# Patient Record
Sex: FEMALE | Race: WHITE
[De-identification: names, ages, dates, MRNs, and addresses within clinical notes are randomized per-mention and may not be internally consistent; named-entity substitution may affect disease eponyms.]

---

## 2021-10-15 ENCOUNTER — HOSPITAL ENCOUNTER (OUTPATIENT)
Dept: HOSPITAL 95 - LAB | Age: 81
Discharge: HOME | End: 2021-10-15
Attending: PHYSICIAN ASSISTANT
Payer: MEDICARE

## 2021-10-15 DIAGNOSIS — E11.9: Primary | ICD-10-CM

## 2021-10-15 LAB
CREAT UR-MCNC: 221 MG/DL (ref 27–270)
MICROALBUMIN UR-MCNC: 32.7 MG/L (ref 0–20)
MICROALBUMIN/CREAT UR: 14.8 MG/G (ref 0–30)

## 2021-12-15 ENCOUNTER — HOSPITAL ENCOUNTER (OUTPATIENT)
Dept: HOSPITAL 95 - ER | Age: 81
Setting detail: OBSERVATION
LOS: 2 days | Discharge: HOME | End: 2021-12-17
Attending: STUDENT IN AN ORGANIZED HEALTH CARE EDUCATION/TRAINING PROGRAM | Admitting: HOSPITALIST
Payer: MEDICARE

## 2021-12-15 VITALS — BODY MASS INDEX: 27 KG/M2 | WEIGHT: 167.99 LBS | HEIGHT: 66 IN

## 2021-12-15 DIAGNOSIS — I11.9: ICD-10-CM

## 2021-12-15 DIAGNOSIS — E11.9: ICD-10-CM

## 2021-12-15 DIAGNOSIS — E03.9: ICD-10-CM

## 2021-12-15 DIAGNOSIS — E78.5: ICD-10-CM

## 2021-12-15 DIAGNOSIS — I70.0: ICD-10-CM

## 2021-12-15 DIAGNOSIS — I65.23: ICD-10-CM

## 2021-12-15 DIAGNOSIS — I16.1: Primary | ICD-10-CM

## 2021-12-15 DIAGNOSIS — I08.1: ICD-10-CM

## 2021-12-15 DIAGNOSIS — Z20.822: ICD-10-CM

## 2021-12-15 DIAGNOSIS — I63.9: ICD-10-CM

## 2021-12-15 DIAGNOSIS — I27.20: ICD-10-CM

## 2021-12-15 DIAGNOSIS — I95.9: ICD-10-CM

## 2021-12-15 LAB
ALBUMIN SERPL BCP-MCNC: 3.4 G/DL (ref 3.4–5)
ALBUMIN/GLOB SERPL: 1 {RATIO} (ref 0.8–1.8)
ALT SERPL W P-5'-P-CCNC: 11 U/L (ref 12–78)
ANION GAP SERPL CALCULATED.4IONS-SCNC: 7 MMOL/L (ref 6–16)
AST SERPL W P-5'-P-CCNC: 13 U/L (ref 12–37)
BASOPHILS # BLD AUTO: 0.13 K/MM3 (ref 0–0.23)
BASOPHILS NFR BLD AUTO: 1 % (ref 0–2)
BILIRUB SERPL-MCNC: 0.4 MG/DL (ref 0.1–1)
BUN SERPL-MCNC: 15 MG/DL (ref 8–24)
CALCIUM SERPL-MCNC: 9 MG/DL (ref 8.5–10.1)
CHLORIDE SERPL-SCNC: 110 MMOL/L (ref 98–108)
CO2 SERPL-SCNC: 24 MMOL/L (ref 21–32)
CREAT SERPL-MCNC: 0.76 MG/DL (ref 0.4–1)
DEPRECATED RDW RBC AUTO: 43.3 FL (ref 35.1–46.3)
EOSINOPHIL # BLD AUTO: 0.4 K/MM3 (ref 0–0.68)
EOSINOPHIL NFR BLD AUTO: 4 % (ref 0–6)
ERYTHROCYTE [DISTWIDTH] IN BLOOD BY AUTOMATED COUNT: 13 % (ref 11.7–14.2)
GLOBULIN SER CALC-MCNC: 3.4 G/DL (ref 2.2–4)
GLUCOSE SERPL-MCNC: 134 MG/DL (ref 70–99)
HCT VFR BLD AUTO: 49.6 % (ref 33–51)
HGB BLD-MCNC: 16.4 G/DL (ref 11.5–16)
IMM GRANULOCYTES # BLD AUTO: 0.05 K/MM3 (ref 0–0.1)
IMM GRANULOCYTES NFR BLD AUTO: 0 % (ref 0–1)
LYMPHOCYTES # BLD AUTO: 3.09 K/MM3 (ref 0.84–5.2)
LYMPHOCYTES NFR BLD AUTO: 27 % (ref 21–46)
MCHC RBC AUTO-ENTMCNC: 33.1 G/DL (ref 31.5–36.5)
MCV RBC AUTO: 91 FL (ref 80–100)
MONOCYTES # BLD AUTO: 1.06 K/MM3 (ref 0.16–1.47)
MONOCYTES NFR BLD AUTO: 9 % (ref 4–13)
NEUTROPHILS # BLD AUTO: 6.64 K/MM3 (ref 1.96–9.15)
NEUTROPHILS NFR BLD AUTO: 59 % (ref 41–73)
NRBC # BLD AUTO: 0 K/MM3 (ref 0–0.02)
NRBC BLD AUTO-RTO: 0 /100 WBC (ref 0–0.2)
PLATELET # BLD AUTO: 256 K/MM3 (ref 150–400)
POTASSIUM SERPL-SCNC: 3.8 MMOL/L (ref 3.5–5.5)
PROT SERPL-MCNC: 6.8 G/DL (ref 6.4–8.2)
SODIUM SERPL-SCNC: 141 MMOL/L (ref 136–145)
TROPONIN I SERPL-MCNC: 0.02 NG/ML (ref 0–0.04)

## 2021-12-15 PROCEDURE — A9270 NON-COVERED ITEM OR SERVICE: HCPCS

## 2021-12-16 LAB
CHOLEST SERPL-MCNC: 191 MG/DL (ref 50–200)
CHOLEST/HDLC SERPL: 3.6 {RATIO}
FLUAV RNA SPEC QL NAA+PROBE: NEGATIVE
FLUBV RNA SPEC QL NAA+PROBE: NEGATIVE
HDLC SERPL-MCNC: 53 MG/DL (ref 39–?)
LDLC SERPL CALC-MCNC: 111 MG/DL (ref 0–110)
LDLC/HDLC SERPL: 2.1 {RATIO}
RSV RNA SPEC QL NAA+PROBE: NEGATIVE
SARS-COV-2 RNA RESP QL NAA+PROBE: NEGATIVE
TRIGL SERPL-MCNC: 135 MG/DL (ref 30–160)
VLDLC SERPL CALC-MCNC: 27 MG/DL (ref 6–32)

## 2021-12-16 NOTE — NUR
Initial Interview with [facility] Community Care Coordinator
1. Who did you speak with?  Spoke with patient
2.  What is the patient's prior level of functions? Independent lives with her
 Chinedu.  They are full time RVers.  Patient is ambulatory able to
perform housekeeping and cooking needs.  Patient ambulatory and states she is
pretty healthy and mobile and lives and active lifestyle.
3.  What is the patient's current living situation?  Lives with ; they
are full time RVers.
4.  Is the patient and/or family able to provide transportation to and from
doctor's appointments and  prescriptions? Yes, patient able to drive
and has transportation.
5.  Does patient still drive?  Yes.
6. POA/PCP/NOK:  PCP-SHARON Vincent/NOK:  Chinedu/two daughters in
[place]
7.  Discharge goals: Home
 -Home: patient more than likely will discharge home-no barriers; patient has
running water/utilities/safe home environment/strong support network of family
and friends.
 -DME: TBD
 -Medication Management: self-management
 -Preferred Pharmacy: [facility]
 -Housekeeping need: patient and  perform as needed
 -Cooking:  and patient cook as needed
8.  List barriers to discharge: None known at this time
9.  Discharge Plan: TBD
10. PCP Follow up appointment: Will be scheduled within seven calendar days of
discharge.  Transition of care will contact patient.

## 2021-12-16 NOTE — NUR
SHIFT SUMMARY:
 
ASSUMED CARE OF PATIENT UPON HER ARRIVAL FROM ED AT 1530. A&O X 4, PLEASANT
AND COOPERATIVE. HYPERTENSIVE ON ARRIVAL TO UNIT; HYDRALAZINE GIVEN, RESULT
161/74. REPORTED THIS TO DR. PERAZA, NO FURTHER PRN'S ORDERED. ON TELEMETRY,
SR @ 82. , NO COVERAGE NEEDED. INDEPENDENT IN ROOM.

## 2021-12-17 NOTE — NUR
SHIFT SUMMARY
 
ASSUMED CARE AT 1900. PT RESTED WELL OVERNIGHT. NO ACUTE EVENTS OVERNIGHT.
VITAL SIGNS WERE STABLE, WNL DURING SHIFT. CARDIAC TELEMETRY MONITORING
CONTINUES, BOX# [id number], SINUS RHYTHM, HR 70, VERIFIED WITH PRAVEENA TELEMETRY
TECH. CBG LAST NIGHT WAS 107MG/DL. SCHEDULED MEDICATIONS WERE ADMINISTERED.
NEW ORDER FOR NIFEDIPINE XL 30MG DAILY TO START 12/17/21 AT 0900 NOTED. PT
PLEASANT. 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 PT IS INDEPENDENT
WITH ADLs AND ABLE TO VOICE HER NEEDS. NO COMPLAINTS VOICED. BED REMAINS IN
LOW POSITION WITH THE CALL LIGHT WITHIN EASY REACH. WILL CONTINUE TO MONITOR.

## 2022-01-18 ENCOUNTER — HOSPITAL ENCOUNTER (OUTPATIENT)
Dept: HOSPITAL 95 - LAB SHORT | Age: 82
End: 2022-01-18
Attending: NURSE PRACTITIONER
Payer: MEDICARE

## 2022-01-18 DIAGNOSIS — N89.8: Primary | ICD-10-CM

## 2022-01-24 ENCOUNTER — HOSPITAL ENCOUNTER (OUTPATIENT)
Dept: HOSPITAL 95 - LAB SHORT | Age: 82
Discharge: HOME | End: 2022-01-24
Attending: OBSTETRICS & GYNECOLOGY
Payer: MEDICARE

## 2022-01-24 DIAGNOSIS — C54.1: Primary | ICD-10-CM

## 2022-01-24 DIAGNOSIS — R19.00: ICD-10-CM

## 2022-01-24 DIAGNOSIS — Z01.419: Primary | ICD-10-CM

## 2022-01-24 PROCEDURE — G0123 SCREEN CERV/VAG THIN LAYER: HCPCS

## 2022-01-26 LAB — OTHER STN SPEC: (no result)

## 2022-05-04 ENCOUNTER — HOSPITAL ENCOUNTER (OUTPATIENT)
Dept: HOSPITAL 95 - ORSCMMR | Age: 82
Discharge: HOME | End: 2022-05-04
Attending: SURGERY
Payer: MEDICARE

## 2022-05-04 VITALS — BODY MASS INDEX: 26.4 KG/M2 | HEIGHT: 66 IN | WEIGHT: 164.24 LBS

## 2022-05-04 DIAGNOSIS — E11.9: ICD-10-CM

## 2022-05-04 DIAGNOSIS — Z79.82: ICD-10-CM

## 2022-05-04 DIAGNOSIS — Z86.73: ICD-10-CM

## 2022-05-04 DIAGNOSIS — Z79.899: ICD-10-CM

## 2022-05-04 DIAGNOSIS — I10: ICD-10-CM

## 2022-05-04 DIAGNOSIS — Z79.84: ICD-10-CM

## 2022-05-04 DIAGNOSIS — I27.20: ICD-10-CM

## 2022-05-04 DIAGNOSIS — C54.8: Primary | ICD-10-CM

## 2022-05-04 LAB
ANION GAP SERPL CALCULATED.4IONS-SCNC: 6 MMOL/L (ref 6–16)
BUN SERPL-MCNC: 15 MG/DL (ref 8–24)
CALCIUM SERPL-MCNC: 9.1 MG/DL (ref 8.5–10.1)
CHLORIDE SERPL-SCNC: 110 MMOL/L (ref 98–108)
CO2 SERPL-SCNC: 27 MMOL/L (ref 21–32)
CREAT SERPL-MCNC: 0.69 MG/DL (ref 0.4–1)
GLUCOSE SERPL-MCNC: 116 MG/DL (ref 70–99)
POTASSIUM SERPL-SCNC: 4.1 MMOL/L (ref 3.5–5.5)
SODIUM SERPL-SCNC: 143 MMOL/L (ref 136–145)

## 2022-05-04 PROCEDURE — A9270 NON-COVERED ITEM OR SERVICE: HCPCS

## 2022-05-04 PROCEDURE — C1788 PORT, INDWELLING, IMP: HCPCS

## 2022-05-04 PROCEDURE — B543ZZA ULTRASONOGRAPHY OF RIGHT JUGULAR VEINS, GUIDANCE: ICD-10-PCS | Performed by: SURGERY

## 2022-05-04 PROCEDURE — 05HM33Z INSERTION OF INFUSION DEVICE INTO RIGHT INTERNAL JUGULAR VEIN, PERCUTANEOUS APPROACH: ICD-10-PCS | Performed by: SURGERY

## 2022-05-04 NOTE — NUR
PT AXOX4, ABLE TO REPOSITION SELF IN BED. REPORTS NO HEADACHE OR CHESTPAIN,
REQUESTING PO FLUIDS. PT HAS TWO INCISON SITES. ONE INCISION SITE ON RIGHT
CHEST AND ONE ON RIGHT NECK. RIGHT CHEST IS COVERED WITH WINDOW TAPE AND GAUZE
AND IS CLEAN, DRY AND INTACT. RIGHT NECK INCISION SITE IS COVERED WITH
STERISTRIP WITH SCAN AMOUNT OF SEROSANGUINEOUS FLUID ON THE DRESSING.

## 2022-09-17 ENCOUNTER — HOSPITAL ENCOUNTER (OUTPATIENT)
Dept: HOSPITAL 95 - ATC | Age: 82
Discharge: HOME | End: 2022-09-17
Attending: OBSTETRICS & GYNECOLOGY
Payer: MEDICARE

## 2022-09-17 DIAGNOSIS — Z79.84: ICD-10-CM

## 2022-09-17 DIAGNOSIS — I10: ICD-10-CM

## 2022-09-17 DIAGNOSIS — E11.9: ICD-10-CM

## 2022-09-17 DIAGNOSIS — Z86.73: ICD-10-CM

## 2022-09-17 DIAGNOSIS — Z79.899: ICD-10-CM

## 2022-09-17 DIAGNOSIS — C54.8: Primary | ICD-10-CM

## 2022-09-17 PROCEDURE — P9016 RBC LEUKOCYTES REDUCED: HCPCS

## 2022-10-18 ENCOUNTER — HOSPITAL ENCOUNTER (OUTPATIENT)
Dept: HOSPITAL 95 - LAB SHORT | Age: 82
End: 2022-10-18
Attending: SURGERY
Payer: MEDICARE

## 2022-10-18 DIAGNOSIS — C54.1: ICD-10-CM

## 2022-10-18 DIAGNOSIS — Z51.0: Primary | ICD-10-CM

## 2022-10-18 LAB
LEUKOCYTE ESTERASE UR QL STRIP: (no result)
PROT UR STRIP-MCNC: (no result) MG/DL
RBC #/AREA URNS HPF: (no result) /HPF (ref 0–2)
SP GR SPEC: 1 (ref 1–1.02)
UROBILINOGEN UR STRIP-MCNC: (no result) MG/DL
WBC #/AREA URNS HPF: (no result) /HPF (ref 0–5)

## 2022-10-20 ENCOUNTER — HOSPITAL ENCOUNTER (INPATIENT)
Dept: HOSPITAL 95 - ER | Age: 82
LOS: 4 days | Discharge: HOME | DRG: 247 | End: 2022-10-24
Attending: INTERNAL MEDICINE | Admitting: INTERNAL MEDICINE
Payer: MEDICARE

## 2022-10-20 VITALS — BODY MASS INDEX: 25.69 KG/M2 | HEIGHT: 66 IN | WEIGHT: 159.84 LBS

## 2022-10-20 DIAGNOSIS — Z92.3: ICD-10-CM

## 2022-10-20 DIAGNOSIS — Z90.49: ICD-10-CM

## 2022-10-20 DIAGNOSIS — I21.09: Primary | ICD-10-CM

## 2022-10-20 DIAGNOSIS — Z79.899: ICD-10-CM

## 2022-10-20 DIAGNOSIS — I48.91: ICD-10-CM

## 2022-10-20 DIAGNOSIS — E03.9: ICD-10-CM

## 2022-10-20 DIAGNOSIS — N39.0: ICD-10-CM

## 2022-10-20 DIAGNOSIS — I27.20: ICD-10-CM

## 2022-10-20 DIAGNOSIS — I95.9: ICD-10-CM

## 2022-10-20 DIAGNOSIS — I50.22: ICD-10-CM

## 2022-10-20 DIAGNOSIS — Z85.42: ICD-10-CM

## 2022-10-20 DIAGNOSIS — Z79.82: ICD-10-CM

## 2022-10-20 DIAGNOSIS — Z79.84: ICD-10-CM

## 2022-10-20 DIAGNOSIS — I10: ICD-10-CM

## 2022-10-20 DIAGNOSIS — Z86.711: ICD-10-CM

## 2022-10-20 DIAGNOSIS — D63.1: ICD-10-CM

## 2022-10-20 DIAGNOSIS — Z79.01: ICD-10-CM

## 2022-10-20 DIAGNOSIS — I21.3: ICD-10-CM

## 2022-10-20 DIAGNOSIS — I11.0: ICD-10-CM

## 2022-10-20 DIAGNOSIS — N18.30: ICD-10-CM

## 2022-10-20 DIAGNOSIS — Z90.710: ICD-10-CM

## 2022-10-20 LAB
ALBUMIN SERPL BCP-MCNC: 3.5 G/DL (ref 3.4–5)
ALBUMIN/GLOB SERPL: 1 {RATIO} (ref 0.8–1.8)
ALT SERPL W P-5'-P-CCNC: 9 U/L (ref 12–78)
ANION GAP SERPL CALCULATED.4IONS-SCNC: 12 MMOL/L (ref 6–16)
AST SERPL W P-5'-P-CCNC: 15 U/L (ref 12–37)
BASOPHILS # BLD AUTO: 0.06 K/MM3 (ref 0–0.23)
BASOPHILS NFR BLD AUTO: 1 % (ref 0–2)
BILIRUB SERPL-MCNC: 0.4 MG/DL (ref 0.1–1)
BUN SERPL-MCNC: 32 MG/DL (ref 8–24)
CALCIUM SERPL-MCNC: 9.5 MG/DL (ref 8.5–10.1)
CHLORIDE SERPL-SCNC: 105 MMOL/L (ref 98–108)
CO2 SERPL-SCNC: 22 MMOL/L (ref 21–32)
CREAT SERPL-MCNC: 1.38 MG/DL (ref 0.4–1)
DEPRECATED RDW RBC AUTO: 71.8 FL (ref 35.1–46.3)
EOSINOPHIL # BLD AUTO: 0.02 K/MM3 (ref 0–0.68)
EOSINOPHIL NFR BLD AUTO: 0 % (ref 0–6)
ERYTHROCYTE [DISTWIDTH] IN BLOOD BY AUTOMATED COUNT: 18 % (ref 11.7–14.2)
GLOBULIN SER CALC-MCNC: 3.5 G/DL (ref 2.2–4)
GLUCOSE SERPL-MCNC: 172 MG/DL (ref 70–99)
HCT VFR BLD AUTO: 28.9 % (ref 33–51)
HGB BLD-MCNC: 9.5 G/DL (ref 11.5–16)
IMM GRANULOCYTES # BLD AUTO: 0.05 K/MM3 (ref 0–0.1)
IMM GRANULOCYTES NFR BLD AUTO: 0 % (ref 0–1)
LYMPHOCYTES # BLD AUTO: 1.64 K/MM3 (ref 0.84–5.2)
LYMPHOCYTES NFR BLD AUTO: 15 % (ref 21–46)
MCHC RBC AUTO-ENTMCNC: 32.9 G/DL (ref 31.5–36.5)
MCV RBC AUTO: 108 FL (ref 80–100)
MONOCYTES # BLD AUTO: 0.85 K/MM3 (ref 0.16–1.47)
MONOCYTES NFR BLD AUTO: 8 % (ref 4–13)
NEUTROPHILS # BLD AUTO: 8.64 K/MM3 (ref 1.96–9.15)
NEUTROPHILS NFR BLD AUTO: 77 % (ref 41–73)
NRBC # BLD AUTO: 0 K/MM3 (ref 0–0.02)
NRBC BLD AUTO-RTO: 0 /100 WBC (ref 0–0.2)
PLATELET # BLD AUTO: 303 K/MM3 (ref 150–400)
POTASSIUM SERPL-SCNC: 4.3 MMOL/L (ref 3.5–5.5)
PROT SERPL-MCNC: 7 G/DL (ref 6.4–8.2)
PROTHROMBIN TIME: 11.3 SEC (ref 9.7–11.5)
SODIUM SERPL-SCNC: 139 MMOL/L (ref 136–145)

## 2022-10-20 PROCEDURE — C9606 PERC D-E COR REVASC W AMI S: HCPCS

## 2022-10-20 PROCEDURE — C1894 INTRO/SHEATH, NON-LASER: HCPCS

## 2022-10-20 PROCEDURE — 027034Z DILATION OF CORONARY ARTERY, ONE ARTERY WITH DRUG-ELUTING INTRALUMINAL DEVICE, PERCUTANEOUS APPROACH: ICD-10-PCS | Performed by: INTERNAL MEDICINE

## 2022-10-20 PROCEDURE — C1725 CATH, TRANSLUMIN NON-LASER: HCPCS

## 2022-10-20 PROCEDURE — C8929 TTE W OR WO FOL WCON,DOPPLER: HCPCS

## 2022-10-20 PROCEDURE — C1757 CATH, THROMBECTOMY/EMBOLECT: HCPCS

## 2022-10-20 PROCEDURE — C1769 GUIDE WIRE: HCPCS

## 2022-10-20 PROCEDURE — C1887 CATHETER, GUIDING: HCPCS

## 2022-10-20 PROCEDURE — B2111ZZ FLUOROSCOPY OF MULTIPLE CORONARY ARTERIES USING LOW OSMOLAR CONTRAST: ICD-10-PCS | Performed by: INTERNAL MEDICINE

## 2022-10-20 PROCEDURE — A9270 NON-COVERED ITEM OR SERVICE: HCPCS

## 2022-10-20 PROCEDURE — C1874 STENT, COATED/COV W/DEL SYS: HCPCS

## 2022-10-20 NOTE — NUR
ARRIVAL TO ICU/SHIFT SUMMARY
PT ARRIVES POST PCI AT 1455. ONE STENT PLACED TO MID LAD. TR BAND TO RIGHT
RADIAL. ECCHYMOSIS ABOVE BAND, SOFT, NON TENDER. DENIES NUMBNESS TO TINGLING
TO HAND. SR, RATE 70'S. BP STABLE. REPEAT EKG DONE. PT C/O MID BACK PAIN,
2/10. LUNGS CLEAR, 98% ON RA. A&OX4. MEDIPORT TO RIGHT CHEST WALL, ACCESSED
PER PT REQUEST. PT COMPLETED CHEMO FOR UTERINE CA, CURRENTLY RECEIVING
RADIATION TX, LAST TREATMENT YESTERDAY. STANDBY ASSIST TO BSC. ABLE TO MAKE
NEEDS KNOWN. WILL CONTINUE TO MONITOR.

## 2022-10-20 NOTE — NUR
This author assumed care of patient at 1900. Patient resting comfortably with
no current needs. TR band in place and not inflated.

## 2022-10-21 LAB
ANION GAP SERPL CALCULATED.4IONS-SCNC: 11 MMOL/L (ref 6–16)
BUN SERPL-MCNC: 34 MG/DL (ref 8–24)
CALCIUM SERPL-MCNC: 8.9 MG/DL (ref 8.5–10.1)
CHLORIDE SERPL-SCNC: 106 MMOL/L (ref 98–108)
CO2 SERPL-SCNC: 22 MMOL/L (ref 21–32)
CREAT SERPL-MCNC: 1.38 MG/DL (ref 0.4–1)
DEPRECATED RDW RBC AUTO: 70.4 FL (ref 35.1–46.3)
ERYTHROCYTE [DISTWIDTH] IN BLOOD BY AUTOMATED COUNT: 17.8 % (ref 11.7–14.2)
GLUCOSE SERPL-MCNC: 181 MG/DL (ref 70–99)
HCT VFR BLD AUTO: 26.6 % (ref 33–51)
HGB BLD-MCNC: 9 G/DL (ref 11.5–16)
MCHC RBC AUTO-ENTMCNC: 33.8 G/DL (ref 31.5–36.5)
MCV RBC AUTO: 107 FL (ref 80–100)
NRBC # BLD AUTO: 0 K/MM3 (ref 0–0.02)
NRBC BLD AUTO-RTO: 0 /100 WBC (ref 0–0.2)
PLATELET # BLD AUTO: 293 K/MM3 (ref 150–400)
POTASSIUM SERPL-SCNC: 4.6 MMOL/L (ref 3.5–5.5)
SODIUM SERPL-SCNC: 139 MMOL/L (ref 136–145)

## 2022-10-21 NOTE — NUR
SHIFT SUMMARY:
 
PATIENT TRANSFER TO PCU 05. ALERT AND ORIENTED X4. PERRLA. DENIES
NUMBNESS/TINGLING. SBA TO BATHROOM/CHAIR. ON ROOM AIR SATING ABOVE 95%. LUNGS
SOUNDING CLEAR AND DIM. TELE SHOWING SINUS RHYTHM WITH HR 80-90'S. DENIES
CHEST PAIN/PRESSURE. RIGHT RADIAL SITE WITH ARM BOARD IN PLACE. SITE
NONTENDER, BLUE/PURPLE BRUISING PROXIMAL TO SITE. NO SIGNS OF BLEEDING. POST
ANGIO EDUCATION AND RIGHT RADIAL SITE CARE REVIEWED. FAMILY AT BEDSIDE. DENIES
ABDOMINAL PAIN. INTERMIT NAUSEA. DENIES NAUSEA AT THIS TIME. EATING CHICKEN
NOODLE SOUP FOR DINNER. UP TO BSC TO URINATE. MEDIPORT ACCESSED AND TKO
INFUSING. CALL LIGHT IN REACH. ORIENTED TO PCU. WILL CONTINUE TO MONITOR AND
REPORT OFF TO ONCOMING RN.

## 2022-10-21 NOTE — NUR
Patient had a mostly uneventful shift overnight. She is alert and oriented x4.
Denies paresthesia. Pain minimal; 2/10 in shoulders, along with some mild
chest pressure. She has had persistent nausea and emesis, requiring call to MD
for one-time Phenergan dose, along with changing PRN Zofran to Q2hr. She has
been SR with occasional ectopy. Slightly hypertensive. No edema appreciated.
Pulses palpable; weaker in BLE. Patient on RA and satting in the mid- to
high-90s. Hemostasis achieved at R radial puncture site so TR band removed.
Patient demonstrates good strength with stand and pivot. SBA. Pt
did have a bowel movement overnight. Voiding spontaneously into commode.
Serial troponins were initially high overnight (>125k).
Per MD, this is expected. AM redraw shows levels declining to approx 70,000.

## 2022-10-21 NOTE — NUR
ASSUMED CARE
REPORT FROM BAM JACOME AT 0700. PT RESTING IN BED. WAKES c VERBAL STIMULI.
FOLLOWS DIRECTIONS. A&OX 4. PT C/O NAUSEA. DENIES CP, SOB. UNABLE TO TAKE PO
MEDS AT THIS TIME. MINIMAL RELIEF FROM ANTIEMETICS. SR, RATE 80-90'S. BP
STABLE. LUNGS CLEAR. PT P/W/D. RIGHT RADIAL SITE c UNCHANGED ECCHYMOSIS AND
SLIGHT SWELLING. OPSITE IN PLACE. PLACED ARMBOARD. EKG DONE FOR QT ANALYSIS.
DR LUGO ROUNDED. WILL CONTINUE TO MONITOR.

## 2022-10-21 NOTE — NUR
SHIFT SUMMARY/TRANSFER TO PCU
NAUSEA IMPROVED THIS SHIFT. MEDICATED c COMPAZINE. MONITORING QTC. ABLE
TO TOLERATE MEDS AND SOUP. SR ON MONITOR, NO ECTOPY NOTED. RATE 80'S. BP
STABLE. DENIES PAIN. STAND BY ASSIST TO BSC. MEDIPORT ACCESSED, TKO. ECHO
COMPLETED THIS SHIFT. WILL CONTINUE TO MONITOR UNTIL TRANSFER TO PCU.

## 2022-10-22 LAB
ANION GAP SERPL CALCULATED.4IONS-SCNC: 11 MMOL/L (ref 6–16)
ANION GAP SERPL CALCULATED.4IONS-SCNC: 7 MMOL/L (ref 6–16)
BASOPHILS # BLD AUTO: 0.03 K/MM3 (ref 0–0.23)
BASOPHILS NFR BLD AUTO: 0 % (ref 0–2)
BUN SERPL-MCNC: 36 MG/DL (ref 8–24)
BUN SERPL-MCNC: 37 MG/DL (ref 8–24)
CALCIUM SERPL-MCNC: 8.6 MG/DL (ref 8.5–10.1)
CALCIUM SERPL-MCNC: 9 MG/DL (ref 8.5–10.1)
CHLORIDE SERPL-SCNC: 100 MMOL/L (ref 98–108)
CHLORIDE SERPL-SCNC: 107 MMOL/L (ref 98–108)
CO2 SERPL-SCNC: 25 MMOL/L (ref 21–32)
CO2 SERPL-SCNC: 26 MMOL/L (ref 21–32)
CREAT SERPL-MCNC: 1.37 MG/DL (ref 0.4–1)
CREAT SERPL-MCNC: 1.47 MG/DL (ref 0.4–1)
DEPRECATED RDW RBC AUTO: 68.9 FL (ref 35.1–46.3)
DEPRECATED RDW RBC AUTO: 73 FL (ref 35.1–46.3)
EOSINOPHIL # BLD AUTO: 0.01 K/MM3 (ref 0–0.68)
EOSINOPHIL NFR BLD AUTO: 0 % (ref 0–6)
ERYTHROCYTE [DISTWIDTH] IN BLOOD BY AUTOMATED COUNT: 17.6 % (ref 11.7–14.2)
ERYTHROCYTE [DISTWIDTH] IN BLOOD BY AUTOMATED COUNT: 18.4 % (ref 11.7–14.2)
GLUCOSE SERPL-MCNC: 126 MG/DL (ref 70–99)
GLUCOSE SERPL-MCNC: 128 MG/DL (ref 70–99)
HCT VFR BLD AUTO: 25.9 % (ref 33–51)
HCT VFR BLD AUTO: 26.6 % (ref 33–51)
HGB BLD-MCNC: 8.5 G/DL (ref 11.5–16)
HGB BLD-MCNC: 8.8 G/DL (ref 11.5–16)
IMM GRANULOCYTES # BLD AUTO: 0.09 K/MM3 (ref 0–0.1)
IMM GRANULOCYTES NFR BLD AUTO: 1 % (ref 0–1)
KETONES UR STRIP-MCNC: (no result) MG/DL
LEUKOCYTE ESTERASE UR QL STRIP: (no result)
LYMPHOCYTES # BLD AUTO: 1.44 K/MM3 (ref 0.84–5.2)
LYMPHOCYTES NFR BLD AUTO: 10 % (ref 21–46)
MCHC RBC AUTO-ENTMCNC: 32.8 G/DL (ref 31.5–36.5)
MCHC RBC AUTO-ENTMCNC: 33.1 G/DL (ref 31.5–36.5)
MCV RBC AUTO: 106 FL (ref 80–100)
MCV RBC AUTO: 108 FL (ref 80–100)
MONOCYTES # BLD AUTO: 1.3 K/MM3 (ref 0.16–1.47)
MONOCYTES NFR BLD AUTO: 9 % (ref 4–13)
NEUTROPHILS # BLD AUTO: 11.72 K/MM3 (ref 1.96–9.15)
NEUTROPHILS NFR BLD AUTO: 80 % (ref 41–73)
NRBC # BLD AUTO: 0 K/MM3 (ref 0–0.02)
NRBC # BLD AUTO: 0 K/MM3 (ref 0–0.02)
NRBC BLD AUTO-RTO: 0 /100 WBC (ref 0–0.2)
NRBC BLD AUTO-RTO: 0 /100 WBC (ref 0–0.2)
PLATELET # BLD AUTO: 255 K/MM3 (ref 150–400)
PLATELET # BLD AUTO: 278 K/MM3 (ref 150–400)
POTASSIUM SERPL-SCNC: 3.7 MMOL/L (ref 3.5–5.5)
POTASSIUM SERPL-SCNC: 4.4 MMOL/L (ref 3.5–5.5)
PROT UR STRIP-MCNC: (no result) MG/DL
RBC #/AREA URNS HPF: (no result) /HPF (ref 0–2)
SODIUM SERPL-SCNC: 137 MMOL/L (ref 136–145)
SODIUM SERPL-SCNC: 139 MMOL/L (ref 136–145)
SP GR SPEC: 1.02 (ref 1–1.02)
TSH SERPL DL<=0.005 MIU/L-ACNC: 0.66 UIU/ML (ref 0.36–4.8)
UROBILINOGEN UR STRIP-MCNC: (no result) MG/DL
WBC CASTS #/AREA URNS LPF: (no result) /LPF

## 2022-10-22 NOTE — NUR
AM NOTE:
 
PATIENT ALERT AND ORIENTED X4. UP WITH SBA TO BSC. STATES SHE IS FEELING SOB
THIS AM LIKE SHE CANNOT TAKE A DEEP BREATH. LUNGS SOUNDING CLEAR AND DIM IN
BASES. NO COUGH. ON ROOM AIR SATING 94-95%. CHEST XRAY PENDING AS WELL AS BNP
LAB DRAW. TELE SHOWING SINUS RHYTHM WITH HR 80-90'S. DENIES CHEST PAIN. BP
STABLE. NO SIGNS OF EDEMA. RIGHT RADIAL SITE POST ANGIO WITH BRUISING
UNCHANGED FROM YESTERDAY DAY SHIFT. ARM BOARD IN PLACE. STATES SHE WAS HAVING
SOME NAUSEA THIS AM, MEDICATED BY NOC SHIFT NURSE. EATING SMALL AMOUNTS.
DENIES ABDOMINAL PAIN. ATTENDS IN PLACE. UP TO BSC TO URINATE. NEEDING URINE
SAMPLE THIS AM. MEDIPORT INFUSING WITH TKO NS. CALL LIGHT IN REACH. DENIES
NEEDS AT THIS TIME. CONTINUOUS PULSE OX IN PLACE TO MONITOR O2 STATUS.

## 2022-10-22 NOTE — NUR
CALL PLACED TO DR. FOWLER TO UPDATE ON BNP RESULTS. NEW ORDERS FOR THIS RN TO
PLACE. 40 MG LASIX IV NOW AS WELL AS 40 MG LASIX PO DAILY STARTING TOMORROW.
ORDERS IN PLACE. STILL WAITING FOR CHEST XRAY RESULTS.

## 2022-10-22 NOTE — NUR
SHIFT SUMMARY:
 
PATIENT REMAINS ALERT AND ORIENTED X4. SLEEPING MOST OF SHIFT. REMAINS ON ROOM
AIR SATING 95-97%. STATES BREATHING IS FEELING BETTER OVERAL COMPARED TO THIS
AM. REMAINS SINUS RHYTHM ON TELE WITH HR 70-80'S AT THIS TIME. BP STABLE.
DENIES CHEST PAIN/PRESSURE. RIGHT RADIAL SITE WNL AND UNCHANGED FROM THIS AM.
PO DIET POOR AND NOT INTERESTED IN EATING MUCH. STILL COMPLAINS OF UPSET
STOMACH BUT DENIES NEED FOR ANY NAUSEA MEDICATION THIS SHIFT. PO TUMS GIVEN
WITH AM MEDS. BOWEL TONES PRESENT. UP TO BSC TO VOID. FAMILY IN TO VISIT.
DENIES NEEDS AT THIS TIME. WILL CONTINUE TO MONITOR AND REPORT OFF TO ONCOMING
RN.

## 2022-10-22 NOTE — NUR
PT CONVERTED INTO AFIB FOR SHORT PERIOD OF TIME WITH RATE -150'S, SHE
WAS JUST WAKING UP ADJUSTING IN THE BED AT THE TIME. DENIED ANY CHEST PAIN,
FLUTTER, PALPITATIONS OR RACING. JUST HAD TO GO TO THE BATHROOM. SHE IS
CURRENTLY BACK OUT OF AFIB. WILL CONTINUE TO MONITOR.

## 2022-10-22 NOTE — NUR
SHIFT SUMMARY: AOX4, SBA TO COMMODE DUE TO IV. NO CHEST PAIN, SOB THIS SHIFT
EXCEPT WITH EXERTION. MILD NAUSEA THIS AM, NO EMESIS, COMPAZINE GIVEN.
REMAINED IN SINUS WITH FEW PVC'S. RIGHT RADIAL REMAINED UNCHANGED, BRUISING
AND IV BOARD IN PLACE. WBC INCREASED THIS AM TO 16.73, CREATINE 1.47 AND BUN
36. NO OTHER CHANGES ARE TO NOTE THIS SHIFT. WILL REPORT OFF.

## 2022-10-22 NOTE — NUR
HR JUMPED UP INTO 'S AND SUSTAINED AROUND 2215. HER RHYTHEM APPEARS TO
BE BETWEEN AFIB AND SVT, OCCATIONAL P WAVES ARE SEEN BUT THEN DISAPPEAR. SHE
WAS SLEEPING AT TIME, WHEN AWAKENED SHE AT FIRST DENIED ANY SYMPTOMS. DR. ECHEVERRIA CALLED AND EKG WAS DONE WHICH SHOWED UNDERDETERMINED RHYTHEM. HR AS HIGH
'S AT TIMES. 5MG OF METOPROLOL WAS GIVEN IV OVER 5 MINUTES. RATE IS NOW
120-140'S AVERAGE. SHE STATES SHE FEELS SHE HAS TO TAKE A DEEPER BREATH. DR. ECHEVERRIA STATES SHE FEELS IT IS REACTION TO HER STENT AND SOMETIMES WILL RESOLVE
ON ITS OWN. CURRENTLY SATS ARE GOOD. NO OTHER TREATMENTS AT THIS TIME, LABS
WERE DRAWN AS WELL.

## 2022-10-22 NOTE — NUR
ASSUMED CARE. AOX3, STATES FEELING A LITTLE BETTER BUT STILL NOT COMPLETLY.
SOB IMPROVED SOME BUT PRESENT WITH EXERTION. LS CLEAR AND DIM IN BASES, NO
COUGH OR CONGESTION. TRACE EDEMA TO BLE. SINUS ON MONITOR. DID HAVE SMALL RUN
OF SVT IN -150'S, LASTING SECONDS. PATIENT ASLEEP AT THAT TIME, SHE
STATES SHE DID NOT FEEL IT. DENIES ANY CHEST PAIN OR DISCOMFORT. WILL CONTINUE
TO MONITOR.

## 2022-10-22 NOTE — NUR
PATIENT STATES BREATHING HAS IMPROVED. SATING 97 ON ROOM AIR. LUNGS SOUNDS
REMAIN CLEAR AND DIM IN BASES. NO COUGH. STATES SHE IS STILL NOT FEELING
HERSELF AND HAS INTERMIT STOMACH UPSET. DENIES NAUSEA AT THIS TIME. DENIES
NEED FOR ANY MEDICATION. LAYING ON LEFT SIDE AND COMFORTABLE. AFTERNOON VITALS
STABLE.

## 2022-10-23 LAB
ANION GAP SERPL CALCULATED.4IONS-SCNC: 7 MMOL/L (ref 6–16)
BASOPHILS # BLD AUTO: 0.01 K/MM3 (ref 0–0.23)
BASOPHILS NFR BLD AUTO: 0 % (ref 0–2)
BUN SERPL-MCNC: 41 MG/DL (ref 8–24)
CALCIUM SERPL-MCNC: 8.4 MG/DL (ref 8.5–10.1)
CHLORIDE SERPL-SCNC: 102 MMOL/L (ref 98–108)
CO2 SERPL-SCNC: 28 MMOL/L (ref 21–32)
CREAT SERPL-MCNC: 1.4 MG/DL (ref 0.4–1)
DEPRECATED RDW RBC AUTO: 70.3 FL (ref 35.1–46.3)
EOSINOPHIL # BLD AUTO: 0.03 K/MM3 (ref 0–0.68)
EOSINOPHIL NFR BLD AUTO: 0 % (ref 0–6)
ERYTHROCYTE [DISTWIDTH] IN BLOOD BY AUTOMATED COUNT: 17.7 % (ref 11.7–14.2)
GLUCOSE SERPL-MCNC: 127 MG/DL (ref 70–99)
HCT VFR BLD AUTO: 25.8 % (ref 33–51)
HGB BLD-MCNC: 8.6 G/DL (ref 11.5–16)
IMM GRANULOCYTES # BLD AUTO: 0.09 K/MM3 (ref 0–0.1)
IMM GRANULOCYTES NFR BLD AUTO: 1 % (ref 0–1)
LYMPHOCYTES # BLD AUTO: 1.46 K/MM3 (ref 0.84–5.2)
LYMPHOCYTES NFR BLD AUTO: 13 % (ref 21–46)
MCHC RBC AUTO-ENTMCNC: 33.3 G/DL (ref 31.5–36.5)
MCV RBC AUTO: 108 FL (ref 80–100)
MONOCYTES # BLD AUTO: 1.15 K/MM3 (ref 0.16–1.47)
MONOCYTES NFR BLD AUTO: 10 % (ref 4–13)
NEUTROPHILS # BLD AUTO: 8.93 K/MM3 (ref 1.96–9.15)
NEUTROPHILS NFR BLD AUTO: 76 % (ref 41–73)
NRBC # BLD AUTO: 0.02 K/MM3 (ref 0–0.02)
NRBC BLD AUTO-RTO: 0.2 /100 WBC (ref 0–0.2)
PLATELET # BLD AUTO: 269 K/MM3 (ref 150–400)
POTASSIUM SERPL-SCNC: 3.6 MMOL/L (ref 3.5–5.5)
SODIUM SERPL-SCNC: 137 MMOL/L (ref 136–145)

## 2022-10-23 NOTE — NUR
SHIFT SUMMARY:
 
NO ACUTE CHANGES. BP REMAINS SOFT BUT STABLE. DENIES SOB. TELE CONTINUES TO
SHOW SINUS RHYTHM WITH HR 70-80'S. WEARING 2L FOR COMFORT SATING %.
DENIES ABDOMINAL PAIN. APPEATITE IMPROVING. FAMILY AT BEDSIDE AND UPDATED ON
PLAN OF CARE. UP TO BSC WITH ONE PERSON ASSISTANCE. BNP TRENDING DOWN.
MEDIPORT INFUSING NS AT TKO. VITALS REMAIN STABLE THROUGHOUT SHIFT. RIGHT
RADIAL SITE WNL. CONTINUES TO DENY CHEST PAIN/PRESSURE. CALL LIGHT IN REACH.
EATING DINNER AT THIS TIME. WILL CONTINUE TO MONITOR AND REPORT OFF TO
ONCOMING RN.

## 2022-10-23 NOTE — NUR
PT HR CONTINUES TO -160'S. BP DROPPING TO 84/49 MAP 60-66. C/O SOB.
CALLED DR. ALMENDAREZ TO INFORM OF CHANGES. NEW ORDERS OBTAINED.

## 2022-10-23 NOTE — NUR
DR. FOWLER AND DR. PALENCIA BY. NEW ORDERS FOR LASIX 20 MG PO DAILY AND
HOLDING PARAMETERS PLACED ON LISINOPRIL AND METOPROLOL. WILL RECHECK BNP THIS
AFTERNOON. BP REMAINS SOFT BUT STABLE AT THIS TIME. PATIENT EDUCATED AND
UPDATED ON CURRENT PLAN.

## 2022-10-23 NOTE — NUR
PT HAS CONVERTED BACK INTO SINUS WITH RATE IN THE 80'S. BP HAS HELD SOFT IN
THE 80-90'S WITH MAP >60. WILL CONTINUE TO MONITOR IF CONVERTION HOLD WILL
PLACE CARDIZEM ON SB.

## 2022-10-23 NOTE — NUR
O2 AT 2L PLACED FOR COMFORT. SATS 97% ON RA BUT SHE FEELS LIKE SHE NEEDS TO
WORK AT TAKING A DEEP BREATH. HR CONTINUES TO -140'S. NOTHING
OUTSTANDING NEW ON LABS. BP HOLDING.

## 2022-10-23 NOTE — NUR
AM NOTE:
 
PATIENT ALERT AND ORIENTED X4. NEURO WNL. OVERALL FEELS WEAK. SBA TO BSC.
DENIES NUMBNESS/TINGLING. WEARING HEARING AIDS AND GLASSESS. PERRLA. ON 2L
NASAL CANNULA FOR COMFORT. PATIENT SATING 100% ON THE 2L NASAL CANNULA. LUNGS
SOUNDING CLEAR AND DIM IN BASES. NO CRACKLES HEARD. DENIES COUGH. SLIGHTLY SOB
WHEN UP TO BSC. STATES BREATHING HAS IMPROVED SINCE YESTERDAY DAY SHIFT. TELE
SHOWING SINUS RHYTHM WITH HR 70-80'S. BP ON SOFTER SIDE. EPISODE OF CARDIZEM
LAST NIGHT SEE NOC SHIFT NOTES FOR DETAILS. CHARGE RN PLACED CALL TO DR. FOWLER
TO UPDATE. HOSP PROVIDOR CONSULT PLACED. THIS RN CALLED DR. PALENCIA TO UPDATE
ON PATIENT STATUS. PLAN TO COME SEE PATIENT SOON. DENIES ABDOMINAL
PAIN. INTERMIT STOMACH UPSET BUT IMPROVED FROM PRIOR DAY SHIFT. ABLE TO EAT
MORE THIS AM. NS RUNNING AT TKO INTO [facility]. CALL LIGHT IN REACH. PATIENT
DENIES NEEDS AT THIS TIME. WILL CONTINUE TO MONITOR.

## 2022-10-23 NOTE — NUR
CARDIZEM GTT STARTED, MAP 64, 'S. LR BOLUS STARTED AT 75ML/HR DUE TO
ELEVATED BNP YESTERDAY MORNING. WILL MONITOR CLOSE. LS CLEAR T/O. TRACE EDEMA
TO THE FEET.

## 2022-10-23 NOTE — NUR
SHIFT SUMMARY: PT CONVERTED INTO AFIB WITH RVR AROUND 2215 WITH HR AS HIGH AS
170'S BUT WAS SUSTAINING ON AVERAGE 150-160'S. SHE WAS GIVEN METOPROLOL IV 5MG
WHICH ONLY BROUGHT HR DOWN 'S FOR SHORT PERIOD OF TIME. EKG SHOWED
UNDERMINED RHYTHEM. ONLY SYMPTOM SHE DEVELOPED WAS FEELING LIKE SHE HAD TO
TAKE A DEEPER BREATH. PLACED ON 02 AT 2L WHICH HELPED TO RELEIVE THE FEELING.
HR CONTINUED TO STAY IN 'S DROPPING BP DOWN TO 80-90'S WITH MAP AVERAGE
60-65. LR BOLUS WAS STARTED AT LOW RATE OF 75 DUE TO HER FLUID OVERLOAD
PREVIOUS MORNING. CARDIZEM WAS STARTED AT 5MCQ AND WAS PLACED ON SB ALMOST 2
HOURS LATER AFTER SHE CONVERTED BACK TO SINUS. BLOOD PRESSURE CONTINUES TO BE
SOFT T/O THE NIGHT WITH MAP 60-65 AVERAGE. SHE HAS HAD FEW DROPS TO 58-59 BUT
COMES RIGHT BACK UP. PT IS FATIQUED FROM NIGHTS EVENTS. WILL REPORT TO
DAYSHIFT.

## 2022-10-24 LAB
ALBUMIN SERPL BCP-MCNC: 3 G/DL (ref 3.4–5)
ALBUMIN/GLOB SERPL: 1 {RATIO} (ref 0.8–1.8)
ALT SERPL W P-5'-P-CCNC: 15 U/L (ref 12–78)
ANION GAP SERPL CALCULATED.4IONS-SCNC: 10 MMOL/L (ref 6–16)
AST SERPL W P-5'-P-CCNC: 30 U/L (ref 12–37)
BASOPHILS # BLD AUTO: 0.02 K/MM3 (ref 0–0.23)
BASOPHILS NFR BLD AUTO: 0 % (ref 0–2)
BILIRUB SERPL-MCNC: 0.5 MG/DL (ref 0.1–1)
BUN SERPL-MCNC: 37 MG/DL (ref 8–24)
CALCIUM SERPL-MCNC: 8.8 MG/DL (ref 8.5–10.1)
CHLORIDE SERPL-SCNC: 101 MMOL/L (ref 98–108)
CO2 SERPL-SCNC: 25 MMOL/L (ref 21–32)
CREAT SERPL-MCNC: 1.06 MG/DL (ref 0.4–1)
DEPRECATED RDW RBC AUTO: 68.9 FL (ref 35.1–46.3)
EOSINOPHIL # BLD AUTO: 0 K/MM3 (ref 0–0.68)
EOSINOPHIL NFR BLD AUTO: 0 % (ref 0–6)
ERYTHROCYTE [DISTWIDTH] IN BLOOD BY AUTOMATED COUNT: 17.4 % (ref 11.7–14.2)
GLOBULIN SER CALC-MCNC: 3 G/DL (ref 2.2–4)
GLUCOSE SERPL-MCNC: 159 MG/DL (ref 70–99)
HCT VFR BLD AUTO: 26.2 % (ref 33–51)
HGB BLD-MCNC: 8.7 G/DL (ref 11.5–16)
IMM GRANULOCYTES # BLD AUTO: 0.12 K/MM3 (ref 0–0.1)
IMM GRANULOCYTES NFR BLD AUTO: 1 % (ref 0–1)
LYMPHOCYTES # BLD AUTO: 0.48 K/MM3 (ref 0.84–5.2)
LYMPHOCYTES NFR BLD AUTO: 5 % (ref 21–46)
MCHC RBC AUTO-ENTMCNC: 33.2 G/DL (ref 31.5–36.5)
MCV RBC AUTO: 107 FL (ref 80–100)
MONOCYTES # BLD AUTO: 0.52 K/MM3 (ref 0.16–1.47)
MONOCYTES NFR BLD AUTO: 5 % (ref 4–13)
NEUTROPHILS # BLD AUTO: 9.37 K/MM3 (ref 1.96–9.15)
NEUTROPHILS NFR BLD AUTO: 89 % (ref 41–73)
NRBC # BLD AUTO: 0 K/MM3 (ref 0–0.02)
NRBC BLD AUTO-RTO: 0 /100 WBC (ref 0–0.2)
PLATELET # BLD AUTO: 264 K/MM3 (ref 150–400)
POTASSIUM SERPL-SCNC: 4.2 MMOL/L (ref 3.5–5.5)
PROT SERPL-MCNC: 6 G/DL (ref 6.4–8.2)
SODIUM SERPL-SCNC: 136 MMOL/L (ref 136–145)

## 2022-10-24 NOTE — NUR
SHIFT SUMMARY
 
PT IS A&OX4, SBA TO THE [facility], CALLS APPROPRIATELY, HAS HAD NO COMPLAINTS OF SOB
OR ANGINA, AND HAD SOME EPISODES OF NAUSEA THIS AM IN WHICH SHE WAS MEDICATED
PER EMAR. SHE WAS STILL HAVING NAUSEA WHEN TRYING TO PASS 0600 MEDS AND
WANTED TO TRY TO TAKE THEM WITH BREAKFAST. SHE HAS BEEN ON 2L NC FOR COMFORT
TONIGHT AND IS SITTING SR 70'S-80'S. HER BED IS IN A LOW POSITION, CALL LIGHT
IS IN REACH, AND BED ALARM IS ON. WILL CONTINUE TO MONITOR UNTIL SHIFT REPORT
IS GIVEN TO THE ONCOMING SHIFT RN. SEE NOTES FOR ANY UPDATES.

## 2022-10-24 NOTE — NUR
SHIFT SUMMARY
PT A/O X4; PLEASANT AND COOPERATIVE WITH CARE. NO ACUTE CHANGES THIS SHIFT. NO
C/O CP/PALPITATIONS/PRESSURE. REPORTS SOME NAUSEA BUT NO VOMITTING. REPORTS
THAT NAUSEA IS ONGOING AND CONSTANT. SBA TO BSC. PT DISCHARGED HOME WITH
FAMILY.